# Patient Record
Sex: FEMALE | Race: WHITE
[De-identification: names, ages, dates, MRNs, and addresses within clinical notes are randomized per-mention and may not be internally consistent; named-entity substitution may affect disease eponyms.]

---

## 2017-02-05 ENCOUNTER — HOSPITAL ENCOUNTER (EMERGENCY)
Dept: HOSPITAL 59 - ER | Age: 32
Discharge: HOME | End: 2017-02-05
Payer: MEDICAID

## 2017-02-05 DIAGNOSIS — R10.9: ICD-10-CM

## 2017-02-05 DIAGNOSIS — N39.0: Primary | ICD-10-CM

## 2017-02-05 LAB
APPEARANCE UR: (no result)
BACTERIA #/AREA URNS HPF: (no result) /[HPF]
BILIRUB UR-MCNC: NEGATIVE MG/DL
COLOR UR: YELLOW
GLUCOSE UR STRIP-MCNC: NEGATIVE MG/DL
KETONES UR QL STRIP: NEGATIVE
NITRITE UR QL STRIP: NEGATIVE
PROT UR QL STRIP: NEGATIVE
RBC # UR STRIP: (no result) /UL
URINE LEUKOCYTE ESTERASE: (no result)
UROBILINOGEN UR STRIP-ACNC: 0.2 E.U./DL (ref 0.2–1)

## 2017-02-05 PROCEDURE — 81001 URINALYSIS AUTO W/SCOPE: CPT

## 2017-02-05 PROCEDURE — 99282 EMERGENCY DEPT VISIT SF MDM: CPT

## 2017-02-05 NOTE — EMERGENCY DEPARTMENT RECORD
History of Present Illness





- General


Chief complaint: Female Urogenital Problem


Stated complaint: UTI


Time Seen by Provider: 17 14:42


Source: Patient


Mode of Arrival: Ambulatory


Limitations: No limitations





- History of Present Illness


Initial comments: 


30 yo female presents to ED with a CC of urinary urgency and burning that began 

approximately 5 days ago.  Patient denies fevers, chills, or back pain symptoms

, and the patient denies health problems at her baseline.


MD Complaint: Dysuria


Onset/Timin


-: Days(s)


Location: Suprapubic


Radiation: Non-radiating


Severity: Moderate


Severity scale (1-10): 7


Quality: Burning


Consistency: Intermittent


Improves with: None


Worsens with: Urination


Patient Pregnant: No


LMP Date: 17


Gestational Age (wks) based on LMP: 1


Associated Symptoms: Abdominal pain, Other





- Related Data


 Home Medications











 Medication  Instructions  Recorded  Confirmed  Last Taken


 


Lamotrigine [Lamictal] 150 mg PO BID 04/26/15 02/05/17 11/10/16


 


Gabapentin [Neurontin] 800 mg PO BID 09/05/16 02/05/17 11/10/16


 


Trazodone HCl 100 mg PO DAILY 09/05/16 02/05/17 11/10/16








 Previous Rx's











 Medication  Instructions  Recorded


 


Ibuprofen [Motrin 200Mg] 600 mg PO Q8HR #30 tablet 14


 


Nitrofurantoin Mono [Macrobid] 100 mg PO BID #14 capsule 17











 Allergies











Allergy/AdvReac Type Severity Reaction Status Date / Time


 


No Known Drug Allergies Allergy   Verified 17 14:46














Travel Screening





- Travel/Exposure Within Last 30 Days


Have you traveled within the last 30 days?: No





Review of Systems


Constitutional: Denies: Chills, Fever, Malaise, Night sweats


Eyes: Denies: Eye discharge, Eye pain


ENT: Denies: Congestion, Ear pain, Epistaxis


Respiratory: Denies: Cough, Dyspnea


Cardiovascular: Denies: Chest pain, Dyspnea on exertion


Endocrine: Denies: Fatigue, Heat or cold intolerance


Gastrointestinal: Denies: Abdominal pain, Nausea, Vomiting


Genitourinary: Reports: Dysuria, Frequency.  Denies: Retention


Musculoskeletal: Denies: Arthralgia, Back pain


Skin: Denies: Bruising, Change in color


Neurological: Denies: Abnormal gait, Confusion, Headache, Seizure


Psychiatric: Denies: Anxiety


Hematological/Lymphatic: Denies: Anemia, Blood Clots





Past Medical History





- SOCIAL HISTORY


Smoking Status: Current every day smoker


Alcohol Use: None


Drug Use: None





- RESPIRATORY


Hx Respiratory Disorders: No





- CARDIOVASCULAR


Hx Cardio Disorders: No





- NEURO


Hx Neuro Disorders: No





- GI


Hx GI Disorders: No





- 


Hx Genitourinary Disorders: No





- ENDOCRINE


Hx Endocrine Disorders: No





- MUSCULOSKELETAL


Hx Musculoskeletal Disorders: Yes


Hx Back Injury: Yes (Neck and head injury in )





- PSYCH


Hx Psych Problems: Yes


Hx Anxiety: Yes





- HEMATOLOGY/ONCOLOGY


Hx Hematology/Oncology Disorders: No





Family Medical History


Any Significant Family History?: Yes


Hx Cancer: Grandparents


*Cancer Comment: Stomach and breast





Physical Exam





- General


General Appearance: Alert, Oriented x3, Cooperative, No acute distress


Limitations: No limitations





- Head


Head exam: Atraumatic, Normocephalic, Normal inspection


Head exam detail: negative: Abrasion, Contusion, Owusu's sign, General 

tenderness, Hematoma, Laceration





- Eye


Eye exam: Normal appearance.  negative: Conjunctival injection, Periorbital 

swelling, Periorbital tenderness, Scleral icterus





- ENT


Ear exam: negative: Auricular hematoma, Auricular trauma


Nasal Exam: negative: Discharge, Dried blood, Foreign body, Sinus tenderness


Mouth exam: negative: Drooling, Laceration, Muffled voice, Tongue elevation





- Neck


Neck exam: Normal inspection.  negative: Meningismus, Tenderness





- Respiratory


Respiratory exam: Normal lung sounds bilaterally.  negative: Respiratory 

distress, Rhonchi, Stridor, Wheezes





- Cardiovascular


Cardiovascular Exam: Regular rate, Normal rhythm, Normal heart sounds





- GI/Abdominal


GI/Abdominal exam: Soft.  negative: Rebound, Rigid, Tenderness





- Rectal


Rectal exam: Deferred





- 


 exam: Deferred





- Extremities


Extremities exam: Normal inspection.  negative: Calf tenderness, Pedal edema, 

Tenderness





- Back


Back exam: Denies: CVA tenderness (R), CVA tenderness (L)





- Neurological


Neurological exam: Alert, Normal gait, Oriented X3





- Psychiatric


Psychiatric exam: Normal affect, Normal mood





- Skin


Skin exam: Normal color.  negative: Abrasion


Type of lesion: negative: abrasion





Course





 Vital Signs











  17





  14:41


 


Temperature 98.4 F


 


Pulse Rate 100 H


 


Respiratory 20





Rate 


 


Blood Pressure 122/75


 


Pulse Ox 99














- Reevaluation(s)


Reevaluation #1: 





17 15:09


UA reviewed, 1+ bacteria, 15-20 WBCs, appears c/w infection.  Will prescribe 

Macrobid for treatment of her urinary tract infection symptoms.  Patient 

appears stable for discharge at this time.





Disposition


Disposition: Discharge


Clinical Impression: 


Urinary tract infection


Qualifiers:


 Urinary tract infection type: site unspecified Hematuria presence: without 

hematuria Qualified Code(s): N39.0 - Urinary tract infection, site not specified


Disposition: Home, Self-Care


Condition: (2) Stable


Instructions:  Urinary Tract Infection in Women (ED)


Additional Instructions: 


Return to ED if your symptoms worsen or if you have any concerns.


Macrobid as directed.


Follow-up with your family doctor in 3-5 days as directed.


Prescriptions: 


Nitrofurantoin Mono [Macrobid] 100 mg PO BID #14 capsule


Forms:  Patient Portal Access


Time of Disposition: 15:11

## 2017-03-07 ENCOUNTER — HOSPITAL ENCOUNTER (EMERGENCY)
Dept: HOSPITAL 59 - ER | Age: 32
Discharge: HOME | End: 2017-03-07
Payer: MEDICAID

## 2017-03-07 DIAGNOSIS — N10: Primary | ICD-10-CM

## 2017-03-07 LAB
ANION GAP SERPL CALC-SCNC: 9.1 MMOL/L (ref 7–16)
APPEARANCE UR: (no result)
BACTERIA #/AREA URNS HPF: (no result) /[HPF]
BILIRUB UR-MCNC: NEGATIVE MG/DL
BUN SERPL-MCNC: 12 MG/DL (ref 7–17)
CO2 SERPL-SCNC: 27.9 MMOL/L (ref 22–30)
COLOR UR: YELLOW
CREAT SERPL-MCNC: 0.6 MG/DL (ref 0.52–1.04)
ERYTHROCYTE [DISTWIDTH] IN BLOOD BY AUTOMATED COUNT: 12.7 % (ref 11.5–14.5)
EST GLOMERULAR FILTRATION RATE: > 60 ML/MIN
GLUCOSE SERPL-MCNC: 108 MG/DL (ref 70–110)
GLUCOSE UR STRIP-MCNC: NEGATIVE MG/DL
HCG,QUALITATIVE URINE: NEGATIVE
HCT VFR BLD CALC: 40.1 % (ref 35–47)
HGB BLD-MCNC: 13.6 GM/DL (ref 11.6–16)
KETONES UR QL STRIP: NEGATIVE
LYMPHOCYTES NFR BLD: 8 % (ref 16–45)
MCH RBC QN AUTO: 31 PG (ref 27–33)
MCHC RBC AUTO-ENTMCNC: 33.9 G/DL (ref 32–36)
MCV RBC AUTO: 91.3 FL (ref 81–97)
MONOCYTES NFR BLD: 4 % (ref 0–9)
NEUTROPHILS NFR BLD AUTO: 88 % (ref 47–80)
NITRITE UR QL STRIP: NEGATIVE
PLATELET # BLD: 277 K/UL (ref 130–400)
PMV BLD AUTO: 9.2 FL (ref 7.4–10.4)
PROT UR QL STRIP: (no result)
RBC # BLD AUTO: 4.39 M/UL (ref 3.8–5.4)
RBC # UR STRIP: (no result) /UL
URINE LEUKOCYTE ESTERASE: (no result)
UROBILINOGEN UR STRIP-ACNC: 0.2 E.U./DL (ref 0.2–1)
WBC # BLD AUTO: 13.1 K/UL (ref 4.2–12.2)
WBC #/AREA URNS HPF: (no result) /[HPF]

## 2017-03-07 PROCEDURE — 96376 TX/PRO/DX INJ SAME DRUG ADON: CPT

## 2017-03-07 PROCEDURE — 81001 URINALYSIS AUTO W/SCOPE: CPT

## 2017-03-07 PROCEDURE — 81025 URINE PREGNANCY TEST: CPT

## 2017-03-07 PROCEDURE — 96375 TX/PRO/DX INJ NEW DRUG ADDON: CPT

## 2017-03-07 PROCEDURE — 96365 THER/PROPH/DIAG IV INF INIT: CPT

## 2017-03-07 PROCEDURE — 74176 CT ABD & PELVIS W/O CONTRAST: CPT

## 2017-03-07 PROCEDURE — 80048 BASIC METABOLIC PNL TOTAL CA: CPT

## 2017-03-07 PROCEDURE — 99284 EMERGENCY DEPT VISIT MOD MDM: CPT

## 2017-03-07 PROCEDURE — 85027 COMPLETE CBC AUTOMATED: CPT

## 2017-03-07 NOTE — EMERGENCY DEPARTMENT RECORD
History of Present Illness





- General


Chief complaint: Female Urogenital Problem


Stated complaint: MY KIDNEYS ARE HURTING


Time Seen by Provider: 17 11:23


Source: Patient


Mode of Arrival: Ambulatory


Limitations: No limitations





- History of Present Illness


Initial comments: 


The patient is here due to a one week hx of bilateral R>L flank pain with 

dysuria. She has had UTI's and kidney infections in the past and it feels 

similar now but the pain is worse. There is no reported fever, chills, vomiting

, or diarrhea.





MD Complaint: Dysuria


Onset/Timin


-: Week(s)


Radiation: R flank


Severity: Severe


Severity scale (1-10): 9


Quality: Sharp


Consistency: Constant


Improves with: None


Worsens with: None


Associated Symptoms: Other





- Related Data


 Home Medications











 Medication  Instructions  Recorded  Confirmed  Last Taken


 


Lamotrigine [Lamictal] 150 mg PO BID 04/26/15 03/07/17 11/10/16


 


Gabapentin [Neurontin] 800 mg PO BID 09/05/16 03/07/17 11/10/16


 


Trazodone HCl 100 mg PO DAILY 09/05/16 03/07/17 11/10/16








 Previous Rx's











 Medication  Instructions  Recorded


 


Ibuprofen [Motrin 200Mg] 600 mg PO Q8HR #30 tablet 14


 


Ciprofloxacin HCl [Cipro] 500 mg PO Q12HR #14 tablet 17


 


Hydrocodone/Acetaminophen [Norco 1 - 2 each PO .EVERY 4-6 HRS PRN 17





5-325 Tablet] #20 tablet 











 Allergies











Allergy/AdvReac Type Severity Reaction Status Date / Time


 


No Known Drug Allergies Allergy   Verified 17 11:14














Travel Screening





- Travel/Exposure Within Last 30 Days


Have you traveled within the last 30 days?: No





Review of Systems


Constitutional: Denies: Chills, Fever


Eyes: Denies: Eye discharge


ENT: Denies: Congestion


Respiratory: Denies: Cough, Dyspnea





Past Medical History





- SOCIAL HISTORY


Smoking Status: Current every day smoker


Alcohol Use: None


Drug Use: None





- RESPIRATORY


Hx Respiratory Disorders: No





- CARDIOVASCULAR


Hx Cardio Disorders: No





- NEURO


Hx Neuro Disorders: No





- GI


Hx GI Disorders: No





- 


Hx Genitourinary Disorders: Yes


Hx UTI: Yes





- ENDOCRINE


Hx Endocrine Disorders: No





- MUSCULOSKELETAL


Hx Musculoskeletal Disorders: Yes


Hx Back Injury: Yes (Neck and head injury in )





- PSYCH


Hx Psych Problems: Yes


Hx Anxiety: Yes





- HEMATOLOGY/ONCOLOGY


Hx Hematology/Oncology Disorders: No





Family Medical History


Any Significant Family History?: Yes


Hx Cancer: Grandparents


*Cancer Comment: Stomach and breast





Physical Exam





- General


General Appearance: Alert, Oriented x3, Cooperative, No acute distress





- Head


Head exam: Atraumatic, Normocephalic, Normal inspection





- Eye


Eye exam: Normal appearance, PERRL





- ENT


Throat exam: Normal inspection.  negative: Tonsillar erythema, Tonsillar exudate





- Neck


Neck exam: Normal inspection, Full ROM.  negative: Tenderness





- Respiratory


Respiratory exam: Normal lung sounds bilaterally.  negative: Respiratory 

distress





- Cardiovascular


Cardiovascular Exam: Regular rate, Normal rhythm, Normal heart sounds





- GI/Abdominal


GI/Abdominal exam: Soft.  negative: Guarding, Rebound, Rigid, Tenderness





- Back


Back exam: Reports: CVA tenderness (R) (mild.), CVA tenderness (L) (mild.)





- Neurological


Neurological exam: Alert, Normal gait.  negative: Abnormal gait, Motor sensory 

deficit





Course





 Vital Signs











  17





  11:09


 


Temperature 98.3 F


 


Pulse Rate 110 H


 


Respiratory 18





Rate 


 


Blood Pressure 139/77


 


Pulse Ox 99














- Reevaluation(s)


Reevaluation #1: 


The patient is doing better at this time. She is still having the pain but it 

is improved. I explained to her that it appears that she has a mild kidney 

infection. We will refer the patient to Dr. Williamson in the [facility]ty clinic 

for next week for further eval.





17 13:43








Medical Decision Making





- Data Complexity


MDM Data: Labs Ordered and/or Reviewed, X-Ray Ordered and/or Reviewed





- Lab Data


Result diagrams: 


 17 11:53





 17 11:53





- Radiology Data


Radiology results: Report reviewed (CT: Neg stone or hydro but prob. 

Pyelonephritis R kidney.)





Disposition


Disposition: Discharge


Clinical Impression: 


 Pyelonephritis


Disposition: Home, Self-Care


Condition: (1) Good


Instructions:  Acute Pyelonephritis (ED)


Additional Instructions: 


Please drink plenty of fluids and take the Cipro and pain medicines as 

directed. Please return to the ER for any increased pain, fever, or vomiting. 

Please see Dr. Williamson next week in the Specialty clinic as directed.


Prescriptions: 


Ciprofloxacin HCl [Cipro] 500 mg PO Q12HR #14 tablet


Hydrocodone/Acetaminophen [Norco 5-325 Tablet] 1 - 2 each PO .EVERY 4-6 HRS PRN 

#20 tablet


 PRN Reason: Pain


Forms:  Patient Portal Access


Time of Disposition: 13:48

## 2017-03-13 NOTE — CT SCAN REPORT
EXAM:  CT SCAN OF THE ABDOMEN AND PELVIS WITHOUT CONTRAST 



HISTORY:  PATIENT HAS KIDNEY PAIN.  PAINFUL URINATION.  



TECHNIQUE:  Serial axial CT scan of the abdomen and pelvis was performed in 
3.75 mm intervals from the dome of the diaphragm down to the pubic symphysis 
without the use of intravenous or oral contrast.  



Comparison:  CT scan of the abdomen and pelvis dated 7/28/14 is provided.  



FINDINGS:  The lung windows of the lung bases demonstrate no CT evidence of a 
focal infiltrate or pleural effusion.  3 mm nodule at the anterior aspect of 
the right middle lobe is unchanged with respect to the prior CT scan.  



The visualized heart size and contour is within normal limits.  



The liver, spleen, pancreas, gallbladder,  and bilateral adrenal glands appear 
unremarkable.  



There is asymmetric hypodensity of the right kidney with mild perinephric fat 
stranding.  No obvious right sided hydronephrosis or hydroureter is noted.  No 
renal or ureteral calculi can be identified, therefore, I suspect this 
asymmetric appearance of the right kidney may be related to pyelonephritis.  
Clinical correlation is recommended.  



The contour and caliber of the abdominal aorta is within normal limits.  There 
is no CT evidence of retroperitoneal, pelvic, or inguinal lymphadenopathy.  



The bowel gas pattern is nonspecific and nonobstructive.  There is no CT 
evidence of free intraperitoneal fluid or free intraperitoneal air.  



The urinary bladder is unremarkable.  The uterus is retroverted.  Occasional 
follicles are identified within the bilateral ovaries.  



Bone windows demonstrate no CT evidence of a fracture or dislocation of the 
visualized osseous structures of the abdomen and pelvis.  



IMPRESSION:  

ASYMMETRIC APPEARANCE OF THE RIGHT KIDNEY IS NOTED AS DESCRIBED ABOVE WITHOUT 
CT EVIDENCE OF RENAL OR URETERAL CALCULI.  NO OBVIOUS HYDRONEPHROSIS OR 
HYDROURETER IS NOTED.  THEREFORE, I SUSPECT THESE FINDINGS MAY BE RELATED TO 
PYELONEPHRITIS.  CLINICAL CORRELATION IS RECOMMENDED.  



JOB NUMBER:  [id number]
[facility]

## 2018-01-30 ENCOUNTER — HOSPITAL ENCOUNTER (EMERGENCY)
Dept: HOSPITAL 59 - ER | Age: 33
Discharge: HOME | End: 2018-01-30
Payer: MEDICAID

## 2018-01-30 DIAGNOSIS — L23.7: Primary | ICD-10-CM

## 2018-01-30 PROCEDURE — 99283 EMERGENCY DEPT VISIT LOW MDM: CPT

## 2018-01-30 PROCEDURE — 96372 THER/PROPH/DIAG INJ SC/IM: CPT

## 2018-01-30 NOTE — EMERGENCY DEPARTMENT RECORD
History of Present Illness





- General


Chief complaint: Rash


Stated complaint: POISON IVY


Time Seen by Provider: 18 18:12


Source: Patient


Mode of Arrival: Ambulatory


Limitations: No limitations





- History of Present Illness


Initial comments: 





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  she has had poison ivy many 

times in the past.  it is on her forearms and face.


MD complaint: Rash


Onset/Timin


-: Days(s)


Hx Tetanus Toxoid Vaccination: Yes


Year of Tetanus Vaccination: unknown


Location: Face, LUE, RUE


Severity: Mild





- Related Data


 Previous Rx's











 Medication  Instructions  Recorded


 


Ibuprofen 200 mg Tablet [Motrin 600 mg PO Q8HR #30 tablet 14





200Mg]  


 


Methylprednisolone [Medrol Dose 4 mg PO ASDIR #1 tab.ds.pk 18





Pack]  











 Allergies











Allergy/AdvReac Type Severity Reaction Status Date / Time


 


No Known Drug Allergies Allergy   Verified 18 18:02














Travel Screening





- Travel/Exposure Within Last 30 Days


Have you traveled within the last 30 days?: No





- Travel/Exposure Within Last Year


Have you traveled outside the [place] in the last year?: No





- Additonal Travel Details


Have you been exposed to anyone with a communicable illness?: No





- Travel Symptoms


Symptom Screening: None





Review of Systems


Reviewed: No additional complaints except as noted below


Constitutional: Reports: As per HPI.  Denies: Chills, Fever, Malaise, Night 

sweats, Weakness, Weight change


Eyes: Reports: As per HPI.  Denies: Eye discharge, Eye pain, Photophobia, 

Vision change


ENT: Reports: As per HPI.  Denies: Congestion, Dental pain, Ear pain, Epistaxis

, Hearing loss, Throat pain


Respiratory: Reports: As per HPI.  Denies: Cough, Dyspnea, Hemoptysis, Stridor, 

Wheezes


Cardiovascular: Reports: As per HPI.  Denies: Arrhythmia, Chest pain, Dyspnea 

on exertion, Edema, Murmurs, Orthopnea, Palpitations, Paroxysmal nocturnal 

dyspnea, Rheumatic Fever, Syncope


Endocrine: Reports: As per HPI.  Denies: Fatigue, Heat or cold intolerance, 

Polydipsia, Polyuria


Gastrointestinal: Reports: As per HPI.  Denies: Abdominal pain, Constipation, 

Diarrhea, Hematemesis, Hematochezia, Melena, Nausea, Vomiting


Genitourinary: Reports: As per HPI.  Denies: Abnormal menses, Discharge, 

Dyspareunia, Dysuria, Frequency, Hematuria, Incontinence, Retention, Urgency


Musculoskeletal: Reports: As per HPI.  Denies: Arthralgia, Back pain, Gout, 

Joint swelling, Myalgia, Neck pain


Skin: Reports: As per HPI.  Denies: Bruising, Change in color, Change in hair/

nails, Lesions, Pruritus, Rash


Neurological: Reports: As per HPI.  Denies: Abnormal gait, Confusion, Headache, 

Numbness, Paresthesias, Seizure, Tingling, Tremors, Vertigo, Weakness


Psychiatric: Reports: As per HPI.  Denies: Anxiety, Auditory hallucinations, 

Depression, Homicidal thoughts, Suicidal thoughts, Visual hallucinations


Hematological/Lymphatic: Reports: As per HPI.  Denies: Anemia, Blood Clots, 

Easy bleeding, Easy bruising, Swollen glands





Past Medical History





- SOCIAL HISTORY


Smoking Status: Current every day smoker


Alcohol Use: Heavy


Drug Use: Occasional


Drug Use Detail:: Marijuana





- RESPIRATORY


Hx Respiratory Disorders: No





- CARDIOVASCULAR


Hx Cardio Disorders: No





- NEURO


Hx Neuro Disorders: No





- GI


Hx GI Disorders: No





- 


Hx Genitourinary Disorders: Yes


Hx UTI: Yes





- ENDOCRINE


Hx Endocrine Disorders: No





- MUSCULOSKELETAL


Hx Musculoskeletal Disorders: Yes


Hx Back Injury: Yes (Neck and head injury in 2010)





- PSYCH


Hx Psych Problems: Yes


Hx Anxiety: Yes





- HEMATOLOGY/ONCOLOGY


Hx Hematology/Oncology Disorders: No





Family Medical History


Any Significant Family History?: No


Hx Cancer: Grandparents


*Cancer Comment: Stomach and breast





Physical Exam





- General


General Appearance: Alert, Oriented x3, Cooperative, No acute distress





- Head


Head exam: Normal inspection





- Eye


Eye exam: Normal appearance, PERRL, EOMI


Pupils: Normal accommodation





- ENT


ENT exam: Normal exam, Mucous membranes moist, Normal external ear exam, Normal 

orophraynx


Ear exam: Normal external inspection.  negative: External canal tenderness


Nasal Exam: Normal inspection.  negative: Discharge, Sinus tenderness


Mouth exam: Normal external inspection, Tongue normal


Teeth exam: Normal inspection.  negative: Dental caries


Throat exam: Normal inspection.  negative: Tonsillar erythema, Tonsillar exudate





- Neck


Neck exam: Normal inspection, Full ROM.  negative: Tenderness





- Respiratory


Respiratory exam: Normal lung sounds bilaterally.  negative: Respiratory 

distress





- Cardiovascular


Cardiovascular Exam: Regular rate, Normal rhythm, Normal heart sounds





- GI/Abdominal


GI/Abdominal exam: Soft, Normal bowel sounds.  negative: Tenderness





- Rectal


Rectal exam: Deferred





- 


 exam: Deferred





- Extremities


Extremities exam: Normal inspection, Full ROM, Normal capillary refill.  

negative: Tenderness





- Back


Back exam: Reports: Normal inspection, Full ROM.  Denies: Muscle spasm, Rash 

noted, Tenderness





- Neurological


Neurological exam: Alert, Normal gait, Oriented X3, Reflexes normal





- Psychiatric


Psychiatric exam: Normal affect, Normal mood





- Skin


Skin exam: Dry, Intact, Normal color, Rash, Warm


Distribution of rash: Face, RUE, LUE


Description of rash: Crusting, Erythematous, Vesicular





Course





 Vital Signs











  18





  17:55


 


Temperature 98.8 F


 


Pulse Rate 106 H


 


Respiratory 18





Rate 


 


Blood Pressure 145/96


 


Pulse Ox 98














Disposition


Disposition: Discharge


Clinical Impression: 


 Poison ivy dermatitis





Disposition: Home, Self-Care


Condition: (1) Good


Instructions:  Poison Ivy (ED), Cold Compress or Soak (ED)


Additional Instructions: 


follow up with family doctor.  return sooner if worse.  continue benadryl every 

6 hours as needed


Prescriptions: 


Methylprednisolone [Medrol Dose Pack] 4 mg PO ASDIR #1 tab.ds.pk


Forms:  Patient Portal Access





Quality





- Quality Measures


Quality Measures: N/A





- Blood Pressure Screening


Does Patient Have Any of the Following: No


Blood Pressure Classification: Hypertensive Reading


Systolic Measurement: 145


Diastolic Measurement: 96


Screening for High Blood Pressure: < First Hypertensive BP, F/U Documented > [

]


First Hypertensive Follow-up Interventions: Follow-up with rescreen GT 1 day 

and LT 4 weeks.

## 2018-07-03 ENCOUNTER — HOSPITAL ENCOUNTER (EMERGENCY)
Dept: HOSPITAL 59 - ER | Age: 33
Discharge: HOME | End: 2018-07-03
Payer: MEDICAID

## 2018-07-03 DIAGNOSIS — S05.01XA: Primary | ICD-10-CM

## 2018-07-03 DIAGNOSIS — F17.210: ICD-10-CM
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
## 2018-07-03 PROCEDURE — 99282 EMERGENCY DEPT VISIT SF MDM: CPT

## 2018-10-16 ENCOUNTER — HOSPITAL ENCOUNTER (EMERGENCY)
Dept: HOSPITAL 59 - ER | Age: 33
LOS: 1 days | Discharge: ANOTHER HOSPITAL | End: 2018-10-17
Payer: MEDICAID

## 2018-10-16 DIAGNOSIS — S42.002A: ICD-10-CM

## 2018-10-16 DIAGNOSIS — F17.210: ICD-10-CM
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
## 2018-10-16 DIAGNOSIS — S27.0XXA: Primary | ICD-10-CM

## 2018-10-16 LAB
ALBUMIN SERPL-MCNC: 4.1 G/DL (ref 4–5)
ALBUMIN/GLOB SERPL: 1.8 {RATIO} (ref 1.1–1.8)
ALP SERPL-CCNC: 63 U/L (ref 35–104)
ALT SERPL-CCNC: 26 U/L (ref ?–33)
ANION GAP SERPL CALC-SCNC: 13 MMOL/L (ref 7–16)
AST SERPL-CCNC: 25 U/L (ref 10–35)
BASOPHILS NFR BLD: 0 % (ref 0–6)
BILIRUB SERPL-MCNC: 0.3 MG/DL (ref 0.2–1)
BUN SERPL-MCNC: 13 MG/DL (ref 6–20)
CO2 SERPL-SCNC: 22 MMOL/L (ref 22–29)
CREAT SERPL-MCNC: 0.7 MG/DL (ref 0.5–0.9)
EOSINOPHIL NFR BLD: 0 % (ref 0–6)
ERYTHROCYTE [DISTWIDTH] IN BLOOD BY AUTOMATED COUNT: 13.5 % (ref 11.5–14.5)
EST GLOMERULAR FILTRATION RATE: > 60 ML/MIN
GLOBULIN SER-MCNC: 2.3 GM/DL (ref 1.4–4.8)
GLUCOSE SERPL-MCNC: 171 MG/DL (ref 74–109)
HCT VFR BLD CALC: 39.1 % (ref 35–47)
HGB BLD-MCNC: 12.8 GM/DL (ref 11.6–16)
LYMPHOCYTES NFR BLD: 10 % (ref 16–45)
MCH RBC QN AUTO: 29.2 PG (ref 27–33)
MCHC RBC AUTO-ENTMCNC: 32.7 G/DL (ref 32–36)
MCV RBC AUTO: 89.3 FL (ref 81–97)
MONOCYTES NFR BLD: 7 % (ref 0–9)
NEUTROPHILS NFR BLD AUTO: 83 % (ref 47–80)
NEUTS BAND NFR BLD: 0 % (ref 0–5)
PLATELET # BLD: 313 K/UL (ref 130–400)
PMV BLD AUTO: 9.2 FL (ref 7.4–10.4)
PROT SERPL-MCNC: 6.4 G/DL (ref 6.6–8.7)
RBC # BLD AUTO: 4.38 M/UL (ref 3.8–5.4)
WBC # BLD AUTO: 15.2 K/UL (ref 4.2–12.2)

## 2018-10-16 PROCEDURE — 71250 CT THORAX DX C-: CPT

## 2018-10-16 PROCEDURE — 80053 COMPREHEN METABOLIC PANEL: CPT

## 2018-10-16 PROCEDURE — 70450 CT HEAD/BRAIN W/O DYE: CPT

## 2018-10-16 PROCEDURE — 80320 DRUG SCREEN QUANTALCOHOLS: CPT

## 2018-10-16 PROCEDURE — 99285 EMERGENCY DEPT VISIT HI MDM: CPT

## 2018-10-16 PROCEDURE — 72125 CT NECK SPINE W/O DYE: CPT

## 2018-10-16 PROCEDURE — 96374 THER/PROPH/DIAG INJ IV PUSH: CPT

## 2018-10-16 PROCEDURE — 96375 TX/PRO/DX INJ NEW DRUG ADDON: CPT

## 2018-10-16 PROCEDURE — 85027 COMPLETE CBC AUTOMATED: CPT

## 2018-10-16 RX ADMIN — SODIUM CHLORIDE SCH MLS/HR: 0.9 INJECTION, SOLUTION INTRAVENOUS at 20:57

## 2018-10-16 RX ADMIN — ONDANSETRON ONE MG: 2 INJECTION INTRAMUSCULAR; INTRAVENOUS at 20:58

## 2018-10-16 RX ADMIN — MORPHINE SULFATE ONE MG: 10 INJECTION INTRAVENOUS at 20:58
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
## 2019-11-28 ENCOUNTER — HOSPITAL ENCOUNTER (EMERGENCY)
Dept: HOSPITAL 59 - ER | Age: 34
Discharge: HOME | End: 2019-11-28
Payer: MEDICAID

## 2019-11-28 DIAGNOSIS — N39.0: Primary | ICD-10-CM

## 2019-11-28 DIAGNOSIS — F17.210: ICD-10-CM

## 2019-11-28 LAB
ABSOLUTE NEUTROPHIL COUNT: 5.59
ALBUMIN SERPL-MCNC: 4.4 G/DL (ref 4–5)
ALBUMIN/GLOB SERPL: 1.5 {RATIO} (ref 1.1–1.8)
ALP SERPL-CCNC: 96 U/L (ref 35–104)
ALT SERPL-CCNC: 31 U/L (ref ?–33)
ANION GAP SERPL CALC-SCNC: 12 MMOL/L (ref 7–16)
APPEARANCE UR: CLEAR
AST SERPL-CCNC: 24 U/L (ref 10–35)
BASOPHILS NFR BLD: 0.6 % (ref 0–6)
BILIRUB SERPL-MCNC: < 0.2 MG/DL (ref 0.2–1)
BILIRUB UR-MCNC: NEGATIVE MG/DL
BUN SERPL-MCNC: 13 MG/DL (ref 6–20)
CO2 SERPL-SCNC: 27 MMOL/L (ref 22–29)
COLOR UR: (no result)
CREAT SERPL-MCNC: 0.5 MG/DL (ref 0.5–0.9)
CRP SERPL-MCNC: 0.86 MG/DL (ref ?–0.5)
EOSINOPHIL NFR BLD: 2.9 % (ref 0–6)
EPI CELLS #/AREA URNS HPF: (no result) /[HPF]
ERYTHROCYTE [DISTWIDTH] IN BLOOD BY AUTOMATED COUNT: 15.2 % (ref 11.5–14.5)
EST GLOMERULAR FILTRATION RATE: > 60 ML/MIN
GLOBULIN SER-MCNC: 2.9 GM/DL (ref 1.4–4.8)
GLUCOSE SERPL-MCNC: 80 MG/DL (ref 74–109)
GLUCOSE UR STRIP-MCNC: NEGATIVE MG/DL
GRANULOCYTES NFR BLD: 66.4 % (ref 47–80)
HCG,QUALITATIVE URINE: NEGATIVE
HCT VFR BLD CALC: 46 % (ref 35–47)
HGB BLD-MCNC: 14.7 GM/DL (ref 11.6–16)
KETONES UR QL STRIP: NEGATIVE
LYMPHOCYTES NFR BLD AUTO: 22.7 % (ref 16–45)
MCH RBC QN AUTO: 28.5 PG (ref 27–33)
MCHC RBC AUTO-ENTMCNC: 32 G/DL (ref 32–36)
MCV RBC AUTO: 89.1 FL (ref 81–97)
MONOCYTES NFR BLD: 7.4 % (ref 0–9)
NITRITE UR QL STRIP: NEGATIVE
PLATELET # BLD: 308 K/UL (ref 130–400)
PMV BLD AUTO: 9.5 FL (ref 7.4–10.4)
PROT SERPL-MCNC: 7.3 G/DL (ref 6.6–8.7)
PROT UR QL STRIP: NEGATIVE
RBC # BLD AUTO: 5.16 M/UL (ref 3.8–5.4)
RBC # UR STRIP: NEGATIVE /UL
RBC #/AREA URNS HPF: (no result) /[HPF]
URINE LEUKOCYTE ESTERASE: (no result)
UROBILINOGEN UR STRIP-ACNC: 0.2 E.U./DL (ref 0.2–1)
WBC # BLD AUTO: 8.4 K/UL (ref 4.2–12.2)

## 2019-11-28 PROCEDURE — 81025 URINE PREGNANCY TEST: CPT

## 2019-11-28 PROCEDURE — 96375 TX/PRO/DX INJ NEW DRUG ADDON: CPT

## 2019-11-28 PROCEDURE — 81001 URINALYSIS AUTO W/SCOPE: CPT

## 2019-11-28 PROCEDURE — 80053 COMPREHEN METABOLIC PANEL: CPT

## 2019-11-28 PROCEDURE — 85025 COMPLETE CBC W/AUTO DIFF WBC: CPT

## 2019-11-28 PROCEDURE — 74176 CT ABD & PELVIS W/O CONTRAST: CPT

## 2019-11-28 PROCEDURE — 96365 THER/PROPH/DIAG IV INF INIT: CPT

## 2019-11-28 PROCEDURE — 86140 C-REACTIVE PROTEIN: CPT

## 2019-11-28 PROCEDURE — 99284 EMERGENCY DEPT VISIT MOD MDM: CPT

## 2019-11-28 NOTE — CT SCAN REPORT
EXAMINATION: CT Abdomen and Pelvis without IV Contrast



EXAM DATE: 11/28/2019 3:24 PM



TECHNIQUE: Standard protocol CT imaging of the abdomen and pelvis was performed without intravenous c
ontrast. 



INDICATION: Flank pain



COMPARISON: 3/7/2017



ENCOUNTER: Not applicable

____________________



CT ABDOMEN AND PELVIS FINDINGS:    



Lung Bases: Unremarkable.  



Hepatobiliary: The liver is smooth in contour. No focal hepatic lesions.



Pancreas: The pancreas is normal.



Spleen: The spleen is not enlarged.



Adrenals: The adrenal glands are normal.



Kidneys, Ureters, & Bladder: No calcified renal or ureteral stones. There is mild haziness of the rig
ht perinephric fat. No hydronephrosis. Both ureters have a normal course and caliber and the urinary 
bladder a normal morphology and uniform wall thickness. No ureteral or bladder calculi are identified
.



Gastrointestinal: The stomach and small bowel are normal with no obstruction or inflammation. No CT s
igns of acute appendicitis. The large bowel is within normal limits. 



Reproductive Organs: Unremarkable



Lymphatic System: There is no adenopathy within the abdomen or pelvis.



Vasculature: Normal caliber abdominal aorta    



Peritoneum: No free fluid, free air, or inflammation 



Abdominal wall & Musculoskeletal: Tiny fat filled periumbilical hernia.



Assessment of the solid organs, soft tissues, and vascular structures is overall limited on noncontra
st imaging,

____________________



IMPRESSION:



No obstructive renal or ureteral stones. There is mild haziness of the fat adjacent to the right kidn
ey which may be due to a recently passed stone or an ascending urinary tract infection. Pyelonephriti
s would be a consideration. Laboratory correlation is recommended.







Dictated by: Chavez Quan on 11/28/2019 3:27 PM.

Electronically signed by: Chavez Quan on 11/28/2019 3:39 PM.
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
## 2020-01-15 ENCOUNTER — HOSPITAL ENCOUNTER (EMERGENCY)
Dept: HOSPITAL 59 - ER | Age: 35
Discharge: ANOTHER HOSPITAL | End: 2020-01-15
Payer: MEDICAID

## 2020-01-15 DIAGNOSIS — N83.202: Primary | ICD-10-CM

## 2020-01-15 DIAGNOSIS — F15.20: ICD-10-CM

## 2020-01-15 DIAGNOSIS — F17.210: ICD-10-CM

## 2020-01-15 DIAGNOSIS — R11.0: ICD-10-CM

## 2020-01-15 DIAGNOSIS — R10.32: ICD-10-CM

## 2020-01-15 DIAGNOSIS — R30.0: ICD-10-CM

## 2020-01-15 LAB
ABSOLUTE NEUTROPHIL COUNT: 2.99
ALBUMIN SERPL-MCNC: 4.1 G/DL (ref 4–5)
ALBUMIN/GLOB SERPL: 1.7 {RATIO} (ref 1.1–1.8)
ALP SERPL-CCNC: 76 U/L (ref 35–104)
ALT SERPL-CCNC: 33 U/L (ref ?–33)
ANION GAP SERPL CALC-SCNC: 15 MMOL/L (ref 7–16)
APPEARANCE UR: CLEAR
AST SERPL-CCNC: 32 U/L (ref 10–35)
BARBITURATE SCREEN URINE: NOT DETECTED
BASOPHILS NFR BLD: 0.8 % (ref 0–6)
BENZODIAZEPINE SCREEN URINE: NOT DETECTED
BILIRUB SERPL-MCNC: 0.3 MG/DL (ref 0.2–1)
BILIRUB UR-MCNC: NEGATIVE MG/DL
BUN SERPL-MCNC: 16 MG/DL (ref 6–20)
CARDIOLIPIN IGM SER IA-ACNC: NOT DETECTED
CARDIOLIPIN IGM SER IA-ACNC: NOT DETECTED
CO2 SERPL-SCNC: 22 MMOL/L (ref 22–29)
COLOR UR: YELLOW
CREAT SERPL-MCNC: 0.6 MG/DL (ref 0.5–0.9)
EOSINOPHIL NFR BLD: 3 % (ref 0–6)
ERYTHROCYTE [DISTWIDTH] IN BLOOD BY AUTOMATED COUNT: 14.4 % (ref 11.5–14.5)
EST GLOMERULAR FILTRATION RATE: > 60 ML/MIN
GLOBULIN SER-MCNC: 2.4 GM/DL (ref 1.4–4.8)
GLUCOSE SERPL-MCNC: 99 MG/DL (ref 74–109)
GLUCOSE UR STRIP-MCNC: NEGATIVE MG/DL
GRANULOCYTES NFR BLD: 49.9 % (ref 47–80)
HCT VFR BLD CALC: 37.9 % (ref 35–47)
HGB BLD-MCNC: 12.1 GM/DL (ref 11.6–16)
KETONES UR QL STRIP: NEGATIVE
LIPASE SERPL-CCNC: 19 U/L (ref 13–60)
LYMPHOCYTES NFR BLD AUTO: 35.3 % (ref 16–45)
MCH RBC QN AUTO: 28.4 PG (ref 27–33)
MCHC RBC AUTO-ENTMCNC: 31.9 G/DL (ref 32–36)
MCV RBC AUTO: 89 FL (ref 81–97)
METHADONE SCREEN URINE: NOT DETECTED
MONOCYTES NFR BLD: 11 % (ref 0–9)
NITRITE UR QL STRIP: NEGATIVE
OPIATE SCREEN URINE: NOT DETECTED
PF4 HEPARIN CMPLX AB SER-ACNC: DETECTED
PF4 HEPARIN CMPLX AB SER-ACNC: NOT DETECTED
PHENCYCLIDINE SCREEN URINE: NOT DETECTED
PLATELET # BLD: 298 K/UL (ref 130–400)
PMV BLD AUTO: 9.7 FL (ref 7.4–10.4)
PROPOXYPHENE SCREEN URINE: NOT DETECTED
PROT SERPL-MCNC: 6.5 G/DL (ref 6.6–8.7)
PROT UR QL STRIP: NEGATIVE
RBC # BLD AUTO: 4.26 M/UL (ref 3.8–5.4)
RBC # UR STRIP: NEGATIVE /UL
THC SCREEN URINE: DETECTED
TRICYCLIC ANTIDEPRESSANT SCRN: NOT DETECTED
URINE LEUKOCYTE ESTERASE: NEGATIVE
UROBILINOGEN UR STRIP-ACNC: 0.2 E.U./DL (ref 0.2–1)
WBC # BLD AUTO: 6 K/UL (ref 4.2–12.2)

## 2020-01-15 PROCEDURE — 80320 DRUG SCREEN QUANTALCOHOLS: CPT

## 2020-01-15 PROCEDURE — 99285 EMERGENCY DEPT VISIT HI MDM: CPT

## 2020-01-15 PROCEDURE — 81025 URINE PREGNANCY TEST: CPT

## 2020-01-15 PROCEDURE — 81003 URINALYSIS AUTO W/O SCOPE: CPT

## 2020-01-15 PROCEDURE — 80053 COMPREHEN METABOLIC PANEL: CPT

## 2020-01-15 PROCEDURE — 74176 CT ABD & PELVIS W/O CONTRAST: CPT

## 2020-01-15 PROCEDURE — 96374 THER/PROPH/DIAG INJ IV PUSH: CPT

## 2020-01-15 PROCEDURE — 96375 TX/PRO/DX INJ NEW DRUG ADDON: CPT

## 2020-01-15 PROCEDURE — 83690 ASSAY OF LIPASE: CPT

## 2020-01-15 PROCEDURE — 80305 DRUG TEST PRSMV DIR OPT OBS: CPT

## 2020-01-15 PROCEDURE — 85025 COMPLETE CBC W/AUTO DIFF WBC: CPT
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
## 2020-01-15 NOTE — CT SCAN REPORT
EXAMINATION: CT Abdomen and Pelvis without IV Contrast

EXAM DATE: 1/15/2020 7:11 AM



TECHNIQUE: Standard protocol CT imaging of the abdomen and pelvis was performed without intravenous c
ontrast. 



INDICATION: Sharp pain in both sides of the lower abdomen which began approximately 3 hours ago

COMPARISON: November 28, 2019



ENCOUNTER: Not applicable

____________________



CT ABDOMEN AND PELVIS FINDINGS:    



Lung Bases: Minimal bibasilar dependent atelectasis right greater than left.  



Hepatobiliary: The liver has a normal size with a smooth surface.  There is no biliary dilatation and
 the gallbladder is unremarkable.



Pancreas: The pancreas is normal.



Spleen: The spleen is not enlarged.



Adrenals: The adrenal glands are normal.



Kidneys, Ureters, & Bladder: Both kidneys have a normal size and morphology.  There is no hydronephro
sis. No renal calculi are present. Both ureters have a normal course and caliber and the urinary blad
leslie a normal morphology and uniform wall thickness. No ureteral or bladder calculi are identified.



Gastrointestinal: The stomach and small bowel are normal with no obstruction or inflammation. The mireya
endix is not identified. The large bowel is within normal limits. 



Reproductive Organs: Low-attenuation asymmetrical left adnexal hypodensity measuring 3.1 x 2.7 cm. Pe
rhaps hemorrhagic cyst in light of the dense pelvic free fluid.



Lymphatic System: There is no adenopathy within the abdomen or pelvis.



Vasculature: Normal caliber abdominal aorta    



Peritoneum: Small to moderate amount of dense free fluid dependently within the pelvis compatible wit
h hemoperitoneum. Perhaps hemorrhagic adnexal cyst. 



Abdominal wall & Musculoskeletal: No suspicious bone lesions.



Assessment of the solid organs, soft tissues, and vascular structures is overall limited on noncontra
st imaging,

____________________



IMPRESSION:



Suspect left adnexal hemorrhagic cyst. Small to moderate amount of dense free fluid dependently withi
n the pelvis.







Dictated by: Aaron Freeman DO on 1/15/2020 7:27 AM.

Electronically signed by: Aaron Freeman DO on 1/15/2020 7:34 AM.

## 2020-01-15 NOTE — EMERGENCY DEPARTMENT RECORD
History of Present Illness





- General


Chief Complaint: Abdominal Pain


Stated Complaint: ABDOMINAL PAIN


Time Seen by Provider: 01/15/20 06:22


Source: Patient


Mode of Arrival: Ambulatory


Limitations: No limitations





- History of Present Illness


MD Complaint: Abdominal pain


Onset/Timin


-: Minutes(s)


Location: [facility], [facility]


Severity scale (1-10): 10


Quality: Sharp


Consistency: Constant


Improves With: Nothing


Worsens With: Nothing


Associated Symptoms: Dysuria





- Related Data


LMP (females 10-50): Last week


Patient Pregnant: No


                                Home Medications











 Medication  Instructions  Recorded  Confirmed  Last Taken


 


Multivitamin [Multiple Vitamins] 1 tab PO DAILY 01/15/20 01/15/20 01/14/20








                                  Previous Rx's











 Medication  Instructions  Recorded


 


Naproxen [Naprosyn] 500 mg PO BID #20 tablet 01/15/20











                                    Allergies











Allergy/AdvReac Type Severity Reaction Status Date / Time


 


No Known Drug Allergies Allergy   Verified 19 14:11














Travel Screening





- Travel/Exposure Within Last 30 Days


Have you traveled within the last 30 days?: No





- Travel/Exposure Within Last Year


Have you traveled outside the [place] in the last year?: No





- Travel Symptoms


Symptom Screening: None





Review of Systems


Constitutional: Reports: As per HPI.  Denies: Chills, Fever, Malaise, Night 

sweats, Weakness, Weight change


Eyes: Reports: As per HPI.  Denies: Eye discharge, Eye pain, Photophobia, Vision

 change


ENT: Reports: As per HPI.  Denies: Congestion, Dental pain, Ear pain, Epistaxis,

 Hearing loss, Throat pain


Respiratory: Reports: As per HPI.  Denies: Cough, Dyspnea, Hemoptysis, Stridor, 

Wheezes


Cardiovascular: Reports: As per HPI.  Denies: Arrhythmia, Chest pain, Dyspnea on

 exertion, Edema, Murmurs, Orthopnea, Palpitations, Paroxysmal nocturnal 

dyspnea, Rheumatic Fever, Syncope


Endocrine: Reports: As per HPI.  Denies: Fatigue, Heat or cold intolerance, 

Polydipsia, Polyuria


Gastrointestinal: Reports: As per HPI.  Denies: Abdominal pain, Constipation, 

Diarrhea, Hematemesis, Hematochezia, Melena, Nausea, Vomiting


Genitourinary: Reports: As per HPI.  Denies: Abnormal menses, Discharge, 

Dyspareunia, Dysuria, Frequency, Hematuria, Incontinence, Retention, Urgency


Musculoskeletal: Reports: As per HPI.  Denies: Arthralgia, Back pain, Gout, 

Joint swelling, Myalgia, Neck pain


Skin: Reports: As per HPI.  Denies: Bruising, Change in color, Change in 

hair/nails, Lesions, Pruritus, Rash


Neurological: Reports: As per HPI.  Denies: Abnormal gait, Confusion, Headache, 

Numbness, Paresthesias, Seizure, Tingling, Tremors, Vertigo, Weakness


Psychiatric: Reports: As per HPI.  Denies: Anxiety, Auditory hallucinations, 

Depression, Homicidal thoughts, Suicidal thoughts, Visual hallucinations


Hematological/Lymphatic: Reports: As per HPI.  Denies: Anemia, Blood Clots, Easy

 bleeding, Easy bruising, Swollen glands





Past Medical History





- SOCIAL HISTORY


Smoking Status: Current every day smoker


Alcohol Use: Heavy


Drug Use: Heavy


Drug Use Detail:: Marijuana





- RESPIRATORY


Hx Respiratory Disorders: No





- CARDIOVASCULAR


Hx Cardio Disorders: No





- NEURO


Hx Neuro Disorders: No





- GI


Hx GI Disorders: No





- 


Hx Genitourinary Disorders: Yes


Hx UTI: Yes





- ENDOCRINE


Hx Endocrine Disorders: No





- MUSCULOSKELETAL


Hx Musculoskeletal Disorders: Yes


Hx Back Injury: Yes (Neck and head injury in )





- PSYCH


Hx Psych Problems: No





- HEMATOLOGY/ONCOLOGY


Hx Hematology/Oncology Disorders: No





Family Medical History


Any Significant Family History?: Yes


Hx Cancer: Grandparents


*Cancer Comment: Stomach and breast





Physical Exam





- General


General Appearance: Alert, Oriented x3, Cooperative, No acute distress


Limitations: No limitations





- Head


Head exam: Normal inspection





- Eye


Eye exam: Normal appearance, PERRL


Pupils: Normal accommodation





- ENT


ENT exam: Normal exam, Mucous membranes moist, Normal external ear exam, Normal 

orophraynx, TM's normal bilaterally


Ear exam: Normal external inspection.  negative: External canal tenderness


Nasal Exam: Normal inspection.  negative: Discharge, Sinus tenderness


Mouth exam: Normal external inspection, Tongue normal


Teeth exam: Normal inspection.  negative: Dental caries


Throat exam: Normal inspection.  negative: Tonsillar erythema, Tonsillar exudate





- Neck


Neck exam: Normal inspection, Full ROM.  negative: Tenderness





- Respiratory


Respiratory exam: Normal lung sounds bilaterally.  negative: Respiratory 

distress





- Cardiovascular


Cardiovascular Exam: Regular rate, Normal rhythm, Normal heart sounds





- GI/Abdominal


GI/Abdominal exam: Soft, Tenderness (left lower quad pain).  negative: 

Distended, Guarding, Rebound, Rigid





- Rectal


Rectal exam: Deferred





- 


 exam: Deferred





- Extremities


Extremities exam: Normal inspection, Full ROM, Normal capillary refill.  

negative: Tenderness





- Back


Back exam: Reports: Normal inspection, Full ROM.  Denies: Muscle spasm, Rash 

noted, Tenderness





- Neurological


Neurological exam: Alert, Normal gait, Oriented X3, Reflexes normal





- Psychiatric


Psychiatric exam: Normal affect, Normal mood





- Skin


Skin exam: Dry, Intact, Normal color, Warm





Course





                                   Vital Signs











  01/15/20





  06:05


 


Temperature 97.5 F L


 


Pulse Rate 91 H


 


Respiratory 24





Rate 


 


Blood Pressure 163/113


 


Pulse Ox 100








took over from Dr. Santiago at 7am and patient was in the CT scanner and chart 

reviewed and report obtained from 


Marlyn and patient informed me sudden left sided lower abdominal pain and she 

denies vomiting or diarrhea and some mild dysuria and she had two shots of 

alcohol today and she drinks every day and admits to an alcohol problem and she 

uses marijuana and tobaccco. PMH right ovarian cyst requiring surgery with 

removal of the cyst.  Patient said the toradol is helping and obtained a urine. 

 patient has a soft abdomin and no rigidity of rebound.  Patient admits to sex 

last night and no pain with sex.





- Reevaluation(s)


Reevaluation #1: 


patient walked to the bathroom without any difficulty


01/15/20 07:51





Reevaluation #2: 


Initially patient only admitted to marijuana and alchol use and when I 

confronted her on the positive meth she admitted to doing that occassionally and

 I told her she needs to see a substance abuse  for help with that 

problem


01/15/20 08:18





Reevaluation #3: 


vitals are stable


01/15/20 08:24





Reevaluation #4: 





01/15/20 08:24


discussing case with Dr Eid and she would like her sent to [facility] to see Gyn 

on call.





01/15/20 09:05





Reevaluation #5: 


discussed case with pateint and she is aware of transfer and care plan.15/20 

09:12











- Consultations


Consultation #1: 


discussed case with Dr Rojas ED physician and will transfer via ambulance.








Medical Decision Making





- Data Complexity


MDM Data: Labs Ordered and/or Reviewed (hg 12.1), X-Ray Ordered and/or Reviewed 

(3 cm left ovarian cyst possibly hemorrgagic small to moderate pelvic fluid)





- Lab Data


Result diagrams: 


                                 01/15/20 06:15





                                 01/15/20 06:15





                                   Lab Results











  01/15/20 01/15/20 01/15/20 Range/Units





  06:15 06:15 06:15 


 


WBC  6.0    (4.2-12.2)  K/uL


 


RBC  4.26    (3.80-5.40)  M/uL


 


Hgb  12.1    (11.6-16.0)  gm/dl


 


Hct  37.9    (35.0-47.0)  %


 


MCV  89.0    (81-97)  fl


 


MCH  28.4    (27-33)  pg


 


MCHC  31.9 L    (32-36)  g/dl


 


RDW  14.4    (11.5-14.5)  %


 


Plt Count  298    (130-400)  K/uL


 


MPV  9.7    (7.4-10.4)  fl


 


Gran %  49.9    (47-80)  %


 


Lymphocytes %  35.3    (16-45)  %


 


Monocytes %  11.0 H    (0-9)  %


 


Eosinophils %  3.0    (0-6)  %


 


Basophils %  0.8    (0-6)  %


 


Absolute Neutrophils  2.99    


 


Sodium   136   (136-145)  mmol/L


 


Potassium   3.7   (3.4-4.5)  mmol/L


 


Chloride   99   ()  mmol/L


 


Carbon Dioxide   22.0   (22-29)  mmol/L


 


Anion Gap   15.0   (7-16)  


 


BUN   16   (6-20)  mg/dL


 


Creatinine   0.6   (0.5-0.9)  mg/dL


 


Estimated GFR   > 60   mL/min


 


Random Glucose   99   ()  mg/dL


 


Calcium   8.9   (8.6-10.0)  mg/dL


 


Total Bilirubin   0.30   (0.2-1.0)  mg/dL


 


AST   32   (10.0-35.0)  U/L


 


ALT   33   (<33)  U/L


 


Alkaline Phosphatase   76   ()  U/L


 


Total Protein   6.5 L   (6.6-8.7)  g/dL


 


Albumin   4.1   (4.0-5.0)  g/dL


 


Globulin   2.4   (1.4-4.8)  gm/dL


 


Albumin/Globulin Ratio   1.7   (1.1-1.8)  


 


Lipase   19   (13-60)  U/L


 


Ethyl Alcohol    0.012 H  (0-0.010)  g/dL














Disposition


Clinical Impression: 


 Substance abuse





Abdominal pain


Qualifiers:


 Abdominal location: left lower quadrant Qualified Code(s): R10.32 - Left lower 

quadrant pain





Ovarian cyst


Qualifiers:


 Laterality: left Qualified Code(s): N83.202 - Unspecified ovarian cyst, left 

side





Disposition: Acute Care Hospital Transfer


Condition: (2) Stable


Additional Instructions: 


  After discussion with Dr Eid will transfer via ambulance to [facility] for 

further evaluation by Gyn on call at [facility].


follow up with substance abuse , phone number given to patient on 

discharge.


Prescriptions: 


Naproxen [Naprosyn] 500 mg PO BID #20 tablet


Forms:  Patient Portal Access


Time of Disposition: 09:18





Quality





- Quality Measures


Quality Measures: N/A





- Blood Pressure Screening


Does Patient Have Any of the Following: No


Blood Pressure Classification: Hypertensive Reading


Systolic Measurement: 163


Diastolic Measurement: 113


Screening for High Blood Pressure: < Pre-Hypertensive BP, F/U Documented > 

[]


Pre-Hypertensive Follow-up Interventions: Referral to alternative/primary care 

provider.